# Patient Record
Sex: MALE | Race: WHITE
[De-identification: names, ages, dates, MRNs, and addresses within clinical notes are randomized per-mention and may not be internally consistent; named-entity substitution may affect disease eponyms.]

---

## 2017-10-09 ENCOUNTER — HOSPITAL ENCOUNTER (EMERGENCY)
Dept: HOSPITAL 45 - C.EDB | Age: 47
Discharge: HOME | End: 2017-10-09
Payer: COMMERCIAL

## 2017-10-09 VITALS — OXYGEN SATURATION: 96 %

## 2017-10-09 VITALS — HEART RATE: 85 BPM | DIASTOLIC BLOOD PRESSURE: 90 MMHG | SYSTOLIC BLOOD PRESSURE: 135 MMHG | OXYGEN SATURATION: 94 %

## 2017-10-09 VITALS
WEIGHT: 291.67 LBS | WEIGHT: 291.67 LBS | BODY MASS INDEX: 38.66 KG/M2 | HEIGHT: 72.99 IN | BODY MASS INDEX: 38.66 KG/M2 | HEIGHT: 72.99 IN

## 2017-10-09 VITALS — TEMPERATURE: 98.42 F

## 2017-10-09 DIAGNOSIS — R07.89: Primary | ICD-10-CM

## 2017-10-09 DIAGNOSIS — E11.9: ICD-10-CM

## 2017-10-09 DIAGNOSIS — Z79.899: ICD-10-CM

## 2017-10-09 DIAGNOSIS — I10: ICD-10-CM

## 2017-10-09 DIAGNOSIS — R11.0: ICD-10-CM

## 2017-10-09 DIAGNOSIS — R06.02: ICD-10-CM

## 2017-10-09 DIAGNOSIS — F41.9: ICD-10-CM

## 2017-10-09 LAB
ANION GAP SERPL CALC-SCNC: 7 MMOL/L (ref 3–11)
BUN SERPL-MCNC: 17 MG/DL (ref 7–18)
BUN/CREAT SERPL: 13 (ref 10–20)
CALCIUM SERPL-MCNC: 9.2 MG/DL (ref 8.5–10.1)
CHLORIDE SERPL-SCNC: 99 MMOL/L (ref 98–107)
CO2 SERPL-SCNC: 29 MMOL/L (ref 21–32)
CREAT CL PREDICTED SERPL C-G-VRATE: 102.4 ML/MIN
CREAT SERPL-MCNC: 1.3 MG/DL (ref 0.6–1.4)
EOSINOPHIL NFR BLD AUTO: 184 K/UL (ref 130–400)
GLUCOSE SERPL-MCNC: 268 MG/DL (ref 70–99)
HCT VFR BLD CALC: 46.2 % (ref 42–52)
MCH RBC QN AUTO: 33.6 PG (ref 25–34)
MCHC RBC AUTO-ENTMCNC: 37 G/DL (ref 32–36)
MCV RBC AUTO: 90.8 FL (ref 80–100)
PMV BLD AUTO: 10 FL (ref 7.4–10.4)
POTASSIUM SERPL-SCNC: 4 MMOL/L (ref 3.5–5.1)
RBC # BLD AUTO: 5.09 M/UL (ref 4.7–6.1)
SODIUM SERPL-SCNC: 136 MMOL/L (ref 136–145)
WBC # BLD AUTO: 9.25 K/UL (ref 4.8–10.8)

## 2017-10-09 NOTE — EMERGENCY ROOM VISIT NOTE
History


Report prepared by Tavo:  Wilver Massey


Under the Supervision of:  Dr. Son Scott M.D.


First contact with patient:  17:12


Chief Complaint:  CHEST PAIN


Stated Complaint:  SOB,LF SIDED CHEST PAIN COMES/GOES


Nursing Triage Summary:  


pt reports X sevaral days has had mid sternal cp that radiates into shoulder , 


today increased exertional sob and nausea





History of Present Illness


The patient is a 46 year old male who presents to the Emergency Room with 

complaints of intermittent chest pain that started a couple of days ago. He 

rates his pain as a 6/10 in severity. The patient describes the pain as sharp 

needles. He states that the chest pain episodes last for 20-30 seconds and go 

in phases. The patient states the pain radiates into his arm, which he 

describes as "sitting on my arm for too long". He reports that that he has also 

been experiencing intermittent nausea and shortness of breath. The patient 

states that he also been experiencing dizziness and lightheadedness 

intermittently. He reports that his vision comes and goes and becomes blurry at 

times. The patient states that these symptoms have not happened at the same 

time until today. He reports that today he also started to experience lower 

back pain that radiated into his left ankle. The patient states that his left 

foot became numb and "tingly". He states that he usually can elevate his foot 

and have this symptom go away, but denies any relief with elevation today. The 

patient admits to a history of hypertension, Diabetes Mellitus, and 

cholecystectomy. He reports that he has a history of lower extremity edema that 

he takes Lasix for. The patient states that his dose was increased from 20 mg 

to 40 mg a couple of weeks ago. He states that he also has a history of anxiety 

that he takes Lorazepam for. The patient admits that he took Lorazepam this 

morning. 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 The patient states that he has a family 

history of heart problems. He denies abdominal pain.





   Source of History:  patient


   Onset:  a couple of days ago


   Position:  chest


   Symptom Intensity:  6/10


   Quality:  other (sharp needles)


   Timing:  intermittent


   Associated Symptoms:  + SOB, + nausea, + back pain, No abdominal pain





Review of Systems


All systems have been listed, reviewed, and are negative other than those 

previously mentioned. Please see Additional Medical History Sheet.





Past Medical & Surgical


Medical Problems:


(1) Anxiety


(2) Diabetes


(3) Hypertension


(4) Lower extremity edema


Surgical Problems:


(1) Hx of cholecystectomy








Family History





Cancer


Diabetes mellitus


Heart disease


Hypertension





Social History


Smoking Status:  Never Smoker


Alcohol Use:  none


Drug Use:  none


Marital Status:  


Housing Status:  lives with family


Occupation Status:  employed





Current/Historical Medications


Scheduled


Furosemide (Lasix), 40 MG PO DAILY


Gabapentin (Neurontin), 600 MG PO QAM


Gabapentin (Neurontin), 300 MG PO QPM


Liraglutide (Victoza), 1.8 UNITS SC QAM


Metformin Hcl (Glucophage), 500 MG PO BID


Omeprazole (Prilosec), 20 MG PO QAM





Scheduled PRN


Lorazepam (Ativan), 0.5 MG PO DAILY PRN for Anxiety


Ondansetron Hcl (Zofran), 1 TAB PO Q6H PRN for Nausea or Vomiting


Zolpidem Tartrate (Zolpidem Tartrate), 5 MG PO HS PRN for Sleep





Allergies


Coded Allergies:  


     Acetaminophen (Verified  Allergy, Mild, Headache, 10/9/17)


     Hydrocodone (Verified  Allergy, Mild, Headache, 10/9/17)


     Codeine (Verified  Allergy, Unknown, TAKES PERCOCET AT HOME, 10/9/17)


Uncoded Allergies:  


     SULFA (Allergy, Mild, Itching, 8/20/16)





Physical Exam


Vital Signs











  Date Time  Temp Pulse Resp B/P (MAP) Pulse Ox O2 Delivery O2 Flow Rate FiO2


 


10/9/17 20:19  85 20 135/90 94 Room Air  


 


10/9/17 19:12  85 20 146/111 95 Room Air  


 


10/9/17 18:28  84 16 129/99 97 Room Air  


 


10/9/17 17:27     96 Room Air  


 


10/9/17 17:24  95      


 


10/9/17 16:34 36.9 100 20 176/92 96 Room Air  











Physical Exam


GENERAL: Patient awake, alert, oriented x 3.  Patient follows commands.  

Patient does not appear toxic.  Patient is adequately hydrated and well-

nourished. Appears anxious and tearful at times.


SKIN: No erythema, pallor, cyanosis or rash


HEENT: Normal head, pupils equal, reactive to light and accommodation. Oral 

cavity and posterior pharynx appear normal.  Neck: Without adenopathy, no neck 

vein distention.


LUNGS: Clear to auscultation.  No wheezes, no rales, no rhonchi.


HEART:  No murmurs. No gallops. No rubs


ABDOMEN: Obese. No masses, no rebound, no hepatomegaly or splenomegaly.


EXTREMITIES: No signs of trauma.  No pedal or pretibial edema.  No calf or 

thigh tenderness.


NEUROLOGIC: Cranial nerves II-XII within normal limits.  No gross motor sensory 

function deficits.





Medical Decision & Procedures


ER Provider


Diagnostic Interpretation:


Radiology results as stated below per my review and radiologist interpretation:





CHEST 2 VIEWS ROUTINE





CLINICAL HISTORY: 46 years-old Male presenting with CHEST PAIN. 





TECHNIQUE: PA and lateral views of the chest were obtained.





COMPARISON:  4/22/2010.





FINDINGS:


Cardiac silhouette enlarged from prior exam. Elevation of the right


hemidiaphragm new from prior. Lungs and pleural spaces clear. Osseous structures


normal. Upper abdomen normal.





IMPRESSION:


1.  Cardiomegaly.





2.  No acute cardiopulmonary disease.











Electronically signed by:  Calin Santana M.D.


10/9/2017 6:20 PM





Dictated Date/Time:  10/9/2017 6:18 PM





Laboratory Results


10/9/17 17:36








10/9/17 17:36

















Test


  10/9/17


17:36 10/9/17


19:13


 


Red Blood Count


  5.09 M/uL


(4.7-6.1) 


 


 


Mean Corpuscular Volume


  90.8 fL


() 


 


 


Mean Corpuscular Hemoglobin


  33.6 pg


(25-34) 


 


 


Mean Corpuscular Hemoglobin


Concent 37.0 g/dl


(32-36) 


 


 


RDW Standard Deviation


  39.1 fL


(36.4-46.3) 


 


 


RDW Coefficient of Variation


  11.8 %


(11.5-14.5) 


 


 


Mean Platelet Volume


  10.0 fL


(7.4-10.4) 


 


 


Anion Gap


  7.0 mmol/L


(3-11) 


 


 


Est Creatinine Clear Calc


Drug Dose 102.4 ml/min 


  


 


 


Estimated GFR (


American) 76.4 


  


 


 


Estimated GFR (Non-


American 65.9 


  


 


 


BUN/Creatinine Ratio 13.0 (10-20)  


 


Calcium Level


  9.2 mg/dl


(8.5-10.1) 


 


 


Chemistry Specimen Hemolysis   


 


Bedside Troponin I


  


  < 0.030 ng/ml


(0-0.045)





Laboratory results as stated above per my review.





Medications Administered











 Medications


  (Trade)  Dose


 Ordered  Sig/Ria


 Route  Start Time


 Stop Time Status Last Admin


Dose Admin


 


 Lorazepam


  (Ativan Tab)  1 mg  NOW  STAT


 SL  10/9/17 17:20


 10/9/17 17:23 DC 10/9/17 17:30


1 MG











ECG


Indication:  chest pain


Rate (beats per minute):  88


Rhythm:  normal sinus


Findings:  no acute ischemic change, no ectopy





ED Course


1717: Past medical records reviewed. The patient was evaluated in room C09. A 

complete history and physical examination was performed. 





1720: Ordered Ativan Tab 1 mg SL.





1951: Upon reevaluation, the patient appeared to have improvement of his 

symptoms. I discussed today's findings with him. The patient verbalized 

agreement of the treatment plan. He was discharged home.





Medical Decision


I considered multiple diagnoses including myocardial infarction, chest wall pain

, pericarditis, myocarditis, aortic emergencies, pulmonary embolism, congestive 

heart failure, GI causes, anxiety, hyperventilation, anxiety and other 

significant cardiopulmonary disorders.





The patient had a complete workup for cardiopulmonary problems.  Chest x-ray 

reveals cardiomegaly but no acute infiltrates.  He is not in failure.  Blood 

pressure remains slightly elevated.  Troponins are negative 2.  The patient 

felt significantly better after 1 mg of Ativan.  I believe much of his problem 

is related to his underlying anxiety.  The patient will be encouraged to follow 

back with his family physician to discuss options about better blood pressure 

control.





Medication Reconcilliation


Current Medication List:  was personally reviewed by me





Blood Pressure Screening


Patient's blood pressure:  Elevated blood pressure


Blood pressure disposition:  Referred to PCP





Impression





 Primary Impression:  


 Atypical chest pain


 Additional Impression:  


 Anxiety





Scribe Attestation


The scribe's documentation has been prepared under my direction and personally 

reviewed by me in its entirety. I confirm that the note above accurately 

reflects all work, treatment, procedures, and medical decision making performed 

by me.





Departure Information


Dispostion


Home / Self-Care





Referrals


Sheldon Craig M.D. (PCP)





Patient Instructions


My [facility]





Additional Instructions





Call your family physician tomorrow morning for an appointment this week.


You may take Ativan every 6-8 hours as needed for anxiety.


Discuss with your family physician options for better blood pressure control.





Problem Qualifiers

## 2017-10-09 NOTE — DIAGNOSTIC IMAGING REPORT
CHEST 2 VIEWS ROUTINE



CLINICAL HISTORY: 46 years-old Male presenting with CHEST PAIN. 



TECHNIQUE: PA and lateral views of the chest were obtained.



COMPARISON:  4/22/2010.



FINDINGS:

Cardiac silhouette enlarged from prior exam. Elevation of the right

hemidiaphragm new from prior. Lungs and pleural spaces clear. Osseous structures

normal. Upper abdomen normal.



IMPRESSION:

1.  Cardiomegaly.



2.  No acute cardiopulmonary disease.







Electronically signed by:  Calin Santana M.D.

10/9/2017 6:20 PM



Dictated Date/Time:  10/9/2017 6:18 PM

## 2018-04-27 ENCOUNTER — HOSPITAL ENCOUNTER (OUTPATIENT)
Dept: HOSPITAL 45 - C.LABBFT | Age: 48
Discharge: HOME | End: 2018-04-27
Attending: INTERNAL MEDICINE
Payer: COMMERCIAL

## 2018-04-27 DIAGNOSIS — E11.40: Primary | ICD-10-CM

## 2018-04-27 LAB
ALBUMIN SERPL-MCNC: 4.1 GM/DL (ref 3.4–5)
ALP SERPL-CCNC: 196 U/L (ref 45–117)
ALT SERPL-CCNC: 45 U/L (ref 12–78)
AST SERPL-CCNC: 14 U/L (ref 15–37)
BASOPHILS # BLD: 0.05 K/UL (ref 0–0.2)
BASOPHILS NFR BLD: 0.4 %
BUN SERPL-MCNC: 21 MG/DL (ref 7–18)
CALCIUM SERPL-MCNC: 9.2 MG/DL (ref 8.5–10.1)
CO2 SERPL-SCNC: 27 MMOL/L (ref 21–32)
CREAT SERPL-MCNC: 1.32 MG/DL (ref 0.6–1.4)
EOS ABS #: 2.56 K/UL (ref 0–0.5)
EOSINOPHIL NFR BLD AUTO: 221 K/UL (ref 130–400)
GLUCOSE SERPL-MCNC: 286 MG/DL (ref 70–99)
HBA1C MFR BLD: 9.2 % (ref 4.5–5.6)
HCT VFR BLD CALC: 48.6 % (ref 42–52)
HGB BLD-MCNC: 17.6 G/DL (ref 14–18)
IG#: 0.03 K/UL (ref 0–0.02)
IMM GRANULOCYTES NFR BLD AUTO: 26.5 %
LYMPHOCYTES # BLD: 3.13 K/UL (ref 1.2–3.4)
MCH RBC QN AUTO: 32.3 PG (ref 25–34)
MCHC RBC AUTO-ENTMCNC: 36.2 G/DL (ref 32–36)
MCV RBC AUTO: 89.2 FL (ref 80–100)
MONO ABS #: 0.8 K/UL (ref 0.11–0.59)
MONOCYTES NFR BLD: 6.8 %
NEUT ABS #: 5.24 K/UL (ref 1.4–6.5)
NEUTROPHILS # BLD AUTO: 21.7 %
NEUTROPHILS NFR BLD AUTO: 44.3 %
PH UR: 265 MG/DL (ref 0–200)
PMV BLD AUTO: 10.3 FL (ref 7.4–10.4)
POTASSIUM SERPL-SCNC: 3.9 MMOL/L (ref 3.5–5.1)
PROT SERPL-MCNC: 8.1 GM/DL (ref 6.4–8.2)
RED CELL DISTRIBUTION WIDTH CV: 11.9 % (ref 11.5–14.5)
RED CELL DISTRIBUTION WIDTH SD: 38.2 FL (ref 36.4–46.3)
SODIUM SERPL-SCNC: 134 MMOL/L (ref 136–145)
WBC # BLD AUTO: 11.81 K/UL (ref 4.8–10.8)